# Patient Record
Sex: FEMALE | Race: WHITE | NOT HISPANIC OR LATINO | URBAN - METROPOLITAN AREA
[De-identification: names, ages, dates, MRNs, and addresses within clinical notes are randomized per-mention and may not be internally consistent; named-entity substitution may affect disease eponyms.]

---

## 2021-10-18 ENCOUNTER — OFFICE VISIT (OUTPATIENT)
Dept: URBAN - METROPOLITAN AREA CLINIC 121 | Facility: CLINIC | Age: 61
End: 2021-10-18

## 2021-12-16 ENCOUNTER — OFFICE VISIT (OUTPATIENT)
Dept: URBAN - METROPOLITAN AREA CLINIC 63 | Facility: CLINIC | Age: 61
End: 2021-12-16

## 2022-01-06 ENCOUNTER — OFFICE VISIT (OUTPATIENT)
Dept: URBAN - METROPOLITAN AREA SURGERY CENTER 4 | Facility: SURGERY CENTER | Age: 62
End: 2022-01-06

## 2022-01-20 ENCOUNTER — OFFICE VISIT (OUTPATIENT)
Dept: URBAN - METROPOLITAN AREA CLINIC 63 | Facility: CLINIC | Age: 62
End: 2022-01-20

## 2022-07-09 ENCOUNTER — TELEPHONE ENCOUNTER (OUTPATIENT)
Dept: URBAN - METROPOLITAN AREA CLINIC 121 | Facility: CLINIC | Age: 62
End: 2022-07-09

## 2022-07-09 RX ORDER — NYSTATIN 100000 [USP'U]/ML
SUSPENSION ORAL
Refills: 0 | OUTPATIENT
Start: 2021-10-15 | End: 2021-12-16

## 2022-07-09 RX ORDER — NYSTATIN 100000 [USP'U]/ML
SUSPENSION ORAL
Refills: 0 | OUTPATIENT
Start: 2021-12-16 | End: 2021-12-16

## 2022-07-10 ENCOUNTER — TELEPHONE ENCOUNTER (OUTPATIENT)
Dept: URBAN - METROPOLITAN AREA CLINIC 121 | Facility: CLINIC | Age: 62
End: 2022-07-10

## 2022-07-10 RX ORDER — CYCLOBENZAPRINE HYDROCHLORIDE 5 MG/1
TABLET, FILM COATED ORAL
Refills: 0 | Status: ACTIVE | COMMUNITY
Start: 2021-12-16

## 2022-07-10 RX ORDER — ALPRAZOLAM 1 MG/1
TABLET ORAL
Refills: 0 | Status: ACTIVE | COMMUNITY
Start: 2021-12-16

## 2025-03-14 ENCOUNTER — DASHBOARD ENCOUNTERS (OUTPATIENT)
Age: 65
End: 2025-03-14

## 2025-03-18 ENCOUNTER — OFFICE VISIT (OUTPATIENT)
Dept: URBAN - METROPOLITAN AREA CLINIC 63 | Facility: CLINIC | Age: 65
End: 2025-03-18

## 2025-03-18 RX ORDER — OSPEMIFENE 60 MG/1
TAKE 1 TABLET BY MOUTH EVERY DAY TABLET, FILM COATED ORAL
Qty: 30 EACH | Refills: 0 | Status: ACTIVE | COMMUNITY

## 2025-03-18 RX ORDER — HYDROCODONE BITARTRATE AND ACETAMINOPHEN 5; 325 MG/1; MG/1
TABLET ORAL
Qty: 28 TABLET | Status: ACTIVE | COMMUNITY

## 2025-03-18 RX ORDER — ALPRAZOLAM 1 MG/1
TABLET ORAL
Qty: 45 TABLET | Status: ACTIVE | COMMUNITY

## 2025-03-18 RX ORDER — ALBUTEROL SULFATE 90 UG/1
INHALE 2 PUFFS BY MOUTH EVERY 4 HOURS AS NEEDED FOR WHEEZING AEROSOL, METERED RESPIRATORY (INHALATION)
Qty: 6.7 GRAM | Refills: 0 | Status: ACTIVE | COMMUNITY

## 2025-03-18 RX ORDER — ESTRADIOL 10 UG/1
INSERT VAGINAL
Qty: 30 TABLET | Status: ACTIVE | COMMUNITY

## 2025-03-18 RX ORDER — BENZONATATE 200 MG/1
CAPSULE ORAL
Qty: 30 CAPSULE | Status: ACTIVE | COMMUNITY

## 2025-03-18 NOTE — HPI-TODAY'S VISIT:
LOV 12/16/2021 Admitted to [facility] 2/12-15/25 with acute pancreatitis, CT scan showed possible pancreatitis, received IV fluids and then on full liquid diet symptoms improved and patient had repeat MRI which showed improvement, discharged last colon? return f/u in 3m  . MRI abdomen with and without contrast 2/15/2025 - Improved interstitial pancreatitis - Small bilateral adrenal adenomas . MRI pelvis with and without contrast 2/15/2025 - Mild degenerative changes in the hips - Visualized bowel and colon are unremarkable - Uterus and adnexa unremarkable for patient's age - Urinary bladder unremarkable . CT abdomen/pelvis with contrast 2/12/2025 - Inflammatory changes surrounding pancreas may be due to pancreatitis, minimal free fluid but no drainable fluid collection or pseudocyst - Stable left adrenal nodule, right adrenal lesion, no definitive fat identified recommend nonemergent MRI given interval change . Labs 2/15/2025 - CBCD: RBC 3.54, hemoglobin 10.5, hematocrit 31.5 otherwise unremarkable - BMP: Anion gap 2, BUN 5, calcium 8.1 otherwise unremarkable . Labs 2/12/2025 - CMP: Sodium 132, CO2 20, total protein 5.9, albumin 3.4 otherwise unremarkable - Magnesium 1.7 - Amylase 2069 - Lipase greater than 6000 - Triglycerides within normal limits - Alcohol level negative